# Patient Record
Sex: FEMALE | URBAN - METROPOLITAN AREA
[De-identification: names, ages, dates, MRNs, and addresses within clinical notes are randomized per-mention and may not be internally consistent; named-entity substitution may affect disease eponyms.]

---

## 2021-11-05 ENCOUNTER — APPOINTMENT (OUTPATIENT)
Age: 31
Setting detail: DERMATOLOGY
End: 2021-11-19

## 2021-11-05 DIAGNOSIS — L30.0 NUMMULAR DERMATITIS: ICD-10-CM

## 2021-11-05 PROBLEM — L30.9 DERMATITIS, UNSPECIFIED: Status: ACTIVE | Noted: 2021-11-05

## 2021-11-05 PROCEDURE — OTHER FOLLOW UP FOR NEXT VISIT: OTHER

## 2021-11-05 PROCEDURE — 99203 OFFICE O/P NEW LOW 30 MIN: CPT

## 2021-11-05 PROCEDURE — OTHER MIPS QUALITY: OTHER

## 2021-11-05 PROCEDURE — OTHER PRESCRIPTION: OTHER

## 2021-11-05 PROCEDURE — OTHER COUNSELING: OTHER

## 2021-11-05 PROCEDURE — OTHER ADDITIONAL NOTES: OTHER

## 2021-11-05 RX ORDER — CLOBETASOL PROPIONATE 0.5 MG/G
OINTMENT TOPICAL
Qty: 30 | Refills: 2 | Status: ERX | COMMUNITY
Start: 2021-11-05

## 2021-11-05 ASSESSMENT — LOCATION SIMPLE DESCRIPTION DERM
LOCATION SIMPLE: RIGHT HAND
LOCATION SIMPLE: LEFT HAND

## 2021-11-05 ASSESSMENT — LOCATION DETAILED DESCRIPTION DERM
LOCATION DETAILED: RIGHT RADIAL DORSAL HAND
LOCATION DETAILED: LEFT RADIAL DORSAL HAND

## 2021-11-05 ASSESSMENT — LOCATION ZONE DERM: LOCATION ZONE: HAND

## 2021-11-05 NOTE — PROCEDURE: ADDITIONAL NOTES
Additional Notes: Patient referred to allergist to get patch testing to ensure rash is not due to contact dermatitis.
Detail Level: Simple
Render Risk Assessment In Note?: no

## 2021-11-05 NOTE — PROCEDURE: COUNSELING
Patient Specific Counseling (Will Not Stick From Patient To Patient): Used non-dyed gloves
Moisturizer Recommendations: Cetaphil, CeraVe, Vanicream, unscented Aveeno \\nPetrolatum jelly/vaseline
Detail Level: Simple
Cleanser Recommendations: Hydrating non-soap cleanser